# Patient Record
Sex: MALE | Race: WHITE | NOT HISPANIC OR LATINO | Employment: OTHER | URBAN - METROPOLITAN AREA
[De-identification: names, ages, dates, MRNs, and addresses within clinical notes are randomized per-mention and may not be internally consistent; named-entity substitution may affect disease eponyms.]

---

## 2021-06-13 ENCOUNTER — HOSPITAL ENCOUNTER (OUTPATIENT)
Facility: HOSPITAL | Age: 51
Setting detail: OBSERVATION
Discharge: HOME | End: 2021-06-16
Attending: EMERGENCY MEDICINE | Admitting: HOSPITALIST

## 2021-06-13 DIAGNOSIS — N39.0 COMPLICATED UTI (URINARY TRACT INFECTION): Primary | ICD-10-CM

## 2021-06-13 DIAGNOSIS — R93.2 ABNORMAL FINDING ON IMAGING OF LIVER: ICD-10-CM

## 2021-06-13 DIAGNOSIS — N17.9 AKI (ACUTE KIDNEY INJURY) (HCC): ICD-10-CM

## 2021-06-13 LAB
ANION GAP SERPL CALCULATED.3IONS-SCNC: 10.3 MMOL/L (ref 5–15)
APTT PPP: 30.7 SECONDS (ref 22.7–35.4)
BACTERIA UR QL AUTO: ABNORMAL /HPF
BASOPHILS # BLD AUTO: 0.08 10*3/MM3 (ref 0–0.2)
BASOPHILS NFR BLD AUTO: 0.5 % (ref 0–1.5)
BILIRUB UR QL STRIP: NEGATIVE
BUN SERPL-MCNC: 15 MG/DL (ref 6–20)
BUN/CREAT SERPL: 10.3 (ref 7–25)
CALCIUM SPEC-SCNC: 9.8 MG/DL (ref 8.6–10.5)
CHLORIDE SERPL-SCNC: 96 MMOL/L (ref 98–107)
CLARITY UR: ABNORMAL
CO2 SERPL-SCNC: 28.7 MMOL/L (ref 22–29)
COLOR UR: ABNORMAL
CREAT SERPL-MCNC: 1.45 MG/DL (ref 0.76–1.27)
D-LACTATE SERPL-SCNC: 1.8 MMOL/L (ref 0.5–2)
DEPRECATED RDW RBC AUTO: 43.5 FL (ref 37–54)
EOSINOPHIL # BLD AUTO: 0.05 10*3/MM3 (ref 0–0.4)
EOSINOPHIL NFR BLD AUTO: 0.3 % (ref 0.3–6.2)
ERYTHROCYTE [DISTWIDTH] IN BLOOD BY AUTOMATED COUNT: 11.9 % (ref 12.3–15.4)
GFR SERPL CREATININE-BSD FRML MDRD: 51 ML/MIN/1.73
GLUCOSE SERPL-MCNC: 282 MG/DL (ref 65–99)
GLUCOSE UR STRIP-MCNC: ABNORMAL MG/DL
HCT VFR BLD AUTO: 47.9 % (ref 37.5–51)
HGB BLD-MCNC: 16.4 G/DL (ref 13–17.7)
HGB UR QL STRIP.AUTO: ABNORMAL
HYALINE CASTS UR QL AUTO: ABNORMAL /LPF
IMM GRANULOCYTES # BLD AUTO: 0.06 10*3/MM3 (ref 0–0.05)
IMM GRANULOCYTES NFR BLD AUTO: 0.4 % (ref 0–0.5)
INR PPP: 1.07 (ref 0.9–1.1)
KETONES UR QL STRIP: ABNORMAL
LEUKOCYTE ESTERASE UR QL STRIP.AUTO: ABNORMAL
LYMPHOCYTES # BLD AUTO: 2.24 10*3/MM3 (ref 0.7–3.1)
LYMPHOCYTES NFR BLD AUTO: 14.9 % (ref 19.6–45.3)
MCH RBC QN AUTO: 33.9 PG (ref 26.6–33)
MCHC RBC AUTO-ENTMCNC: 34.2 G/DL (ref 31.5–35.7)
MCV RBC AUTO: 99 FL (ref 79–97)
MONOCYTES # BLD AUTO: 1.05 10*3/MM3 (ref 0.1–0.9)
MONOCYTES NFR BLD AUTO: 7 % (ref 5–12)
NEUTROPHILS NFR BLD AUTO: 11.55 10*3/MM3 (ref 1.7–7)
NEUTROPHILS NFR BLD AUTO: 76.9 % (ref 42.7–76)
NITRITE UR QL STRIP: POSITIVE
NRBC BLD AUTO-RTO: 0 /100 WBC (ref 0–0.2)
PH UR STRIP.AUTO: 5.5 [PH] (ref 5–8)
PLATELET # BLD AUTO: 301 10*3/MM3 (ref 140–450)
PMV BLD AUTO: 8.9 FL (ref 6–12)
POTASSIUM SERPL-SCNC: 4.4 MMOL/L (ref 3.5–5.2)
PROT UR QL STRIP: ABNORMAL
PROTHROMBIN TIME: 13.7 SECONDS (ref 11.7–14.2)
RBC # BLD AUTO: 4.84 10*6/MM3 (ref 4.14–5.8)
RBC # UR: ABNORMAL /HPF
REF LAB TEST METHOD: ABNORMAL
SODIUM SERPL-SCNC: 135 MMOL/L (ref 136–145)
SP GR UR STRIP: 1.03 (ref 1–1.03)
SQUAMOUS #/AREA URNS HPF: ABNORMAL /HPF
UROBILINOGEN UR QL STRIP: ABNORMAL
WBC # BLD AUTO: 15.03 10*3/MM3 (ref 3.4–10.8)
WBC UR QL AUTO: ABNORMAL /HPF

## 2021-06-13 PROCEDURE — 83605 ASSAY OF LACTIC ACID: CPT | Performed by: EMERGENCY MEDICINE

## 2021-06-13 PROCEDURE — U0004 COV-19 TEST NON-CDC HGH THRU: HCPCS | Performed by: EMERGENCY MEDICINE

## 2021-06-13 PROCEDURE — 87186 SC STD MICRODIL/AGAR DIL: CPT | Performed by: EMERGENCY MEDICINE

## 2021-06-13 PROCEDURE — 87086 URINE CULTURE/COLONY COUNT: CPT | Performed by: EMERGENCY MEDICINE

## 2021-06-13 PROCEDURE — 81001 URINALYSIS AUTO W/SCOPE: CPT | Performed by: EMERGENCY MEDICINE

## 2021-06-13 PROCEDURE — 85610 PROTHROMBIN TIME: CPT | Performed by: EMERGENCY MEDICINE

## 2021-06-13 PROCEDURE — 96361 HYDRATE IV INFUSION ADD-ON: CPT

## 2021-06-13 PROCEDURE — 87040 BLOOD CULTURE FOR BACTERIA: CPT | Performed by: EMERGENCY MEDICINE

## 2021-06-13 PROCEDURE — 85025 COMPLETE CBC W/AUTO DIFF WBC: CPT | Performed by: EMERGENCY MEDICINE

## 2021-06-13 PROCEDURE — G0378 HOSPITAL OBSERVATION PER HR: HCPCS

## 2021-06-13 PROCEDURE — 96365 THER/PROPH/DIAG IV INF INIT: CPT

## 2021-06-13 PROCEDURE — 25010000002 CEFTRIAXONE PER 250 MG: Performed by: EMERGENCY MEDICINE

## 2021-06-13 PROCEDURE — 87088 URINE BACTERIA CULTURE: CPT | Performed by: EMERGENCY MEDICINE

## 2021-06-13 PROCEDURE — C9803 HOPD COVID-19 SPEC COLLECT: HCPCS

## 2021-06-13 PROCEDURE — 99284 EMERGENCY DEPT VISIT MOD MDM: CPT

## 2021-06-13 PROCEDURE — 80048 BASIC METABOLIC PNL TOTAL CA: CPT | Performed by: EMERGENCY MEDICINE

## 2021-06-13 PROCEDURE — 85730 THROMBOPLASTIN TIME PARTIAL: CPT | Performed by: EMERGENCY MEDICINE

## 2021-06-13 RX ORDER — ONDANSETRON 2 MG/ML
4 INJECTION INTRAMUSCULAR; INTRAVENOUS EVERY 6 HOURS PRN
Status: DISCONTINUED | OUTPATIENT
Start: 2021-06-13 | End: 2021-06-16 | Stop reason: HOSPADM

## 2021-06-13 RX ORDER — GLIPIZIDE 5 MG/1
5 TABLET ORAL
COMMUNITY

## 2021-06-13 RX ORDER — SODIUM CHLORIDE 9 MG/ML
75 INJECTION, SOLUTION INTRAVENOUS CONTINUOUS
Status: DISCONTINUED | OUTPATIENT
Start: 2021-06-13 | End: 2021-06-15

## 2021-06-13 RX ORDER — CEFTRIAXONE SODIUM 1 G/50ML
1 INJECTION, SOLUTION INTRAVENOUS ONCE
Status: COMPLETED | OUTPATIENT
Start: 2021-06-13 | End: 2021-06-13

## 2021-06-13 RX ORDER — INSULIN LISPRO 100 [IU]/ML
0-9 INJECTION, SOLUTION INTRAVENOUS; SUBCUTANEOUS
Status: DISCONTINUED | OUTPATIENT
Start: 2021-06-14 | End: 2021-06-16 | Stop reason: HOSPADM

## 2021-06-13 RX ORDER — NICOTINE POLACRILEX 4 MG
15 LOZENGE BUCCAL
Status: DISCONTINUED | OUTPATIENT
Start: 2021-06-13 | End: 2021-06-16 | Stop reason: HOSPADM

## 2021-06-13 RX ORDER — LISINOPRIL AND HYDROCHLOROTHIAZIDE 20; 12.5 MG/1; MG/1
1 TABLET ORAL EVERY MORNING
COMMUNITY

## 2021-06-13 RX ORDER — SODIUM CHLORIDE 0.9 % (FLUSH) 0.9 %
10 SYRINGE (ML) INJECTION AS NEEDED
Status: DISCONTINUED | OUTPATIENT
Start: 2021-06-13 | End: 2021-06-16 | Stop reason: HOSPADM

## 2021-06-13 RX ORDER — CEFTRIAXONE SODIUM 1 G/50ML
1 INJECTION, SOLUTION INTRAVENOUS EVERY 24 HOURS
Status: DISCONTINUED | OUTPATIENT
Start: 2021-06-14 | End: 2021-06-15

## 2021-06-13 RX ORDER — ACETAMINOPHEN 325 MG/1
650 TABLET ORAL EVERY 4 HOURS PRN
Status: DISCONTINUED | OUTPATIENT
Start: 2021-06-13 | End: 2021-06-16

## 2021-06-13 RX ORDER — ONDANSETRON 4 MG/1
4 TABLET, FILM COATED ORAL EVERY 6 HOURS PRN
Status: DISCONTINUED | OUTPATIENT
Start: 2021-06-13 | End: 2021-06-16 | Stop reason: HOSPADM

## 2021-06-13 RX ORDER — NITROGLYCERIN 0.4 MG/1
0.4 TABLET SUBLINGUAL
Status: DISCONTINUED | OUTPATIENT
Start: 2021-06-13 | End: 2021-06-14

## 2021-06-13 RX ORDER — UREA 10 %
3 LOTION (ML) TOPICAL NIGHTLY PRN
Status: DISCONTINUED | OUTPATIENT
Start: 2021-06-13 | End: 2021-06-16 | Stop reason: HOSPADM

## 2021-06-13 RX ORDER — DEXTROSE MONOHYDRATE 25 G/50ML
25 INJECTION, SOLUTION INTRAVENOUS
Status: DISCONTINUED | OUTPATIENT
Start: 2021-06-13 | End: 2021-06-16 | Stop reason: HOSPADM

## 2021-06-13 RX ORDER — LISINOPRIL 20 MG/1
20 TABLET ORAL
Status: DISCONTINUED | OUTPATIENT
Start: 2021-06-14 | End: 2021-06-16 | Stop reason: HOSPADM

## 2021-06-13 RX ADMIN — SODIUM CHLORIDE 75 ML/HR: 9 INJECTION, SOLUTION INTRAVENOUS at 23:03

## 2021-06-13 RX ADMIN — ACETAMINOPHEN 650 MG: 325 TABLET, FILM COATED ORAL at 23:09

## 2021-06-13 RX ADMIN — SODIUM CHLORIDE, POTASSIUM CHLORIDE, SODIUM LACTATE AND CALCIUM CHLORIDE 1000 ML: 600; 310; 30; 20 INJECTION, SOLUTION INTRAVENOUS at 17:11

## 2021-06-13 RX ADMIN — CEFTRIAXONE SODIUM 1 G: 1 INJECTION, SOLUTION INTRAVENOUS at 18:46

## 2021-06-13 NOTE — ED TRIAGE NOTES
Pt states he has been unable to urinate since this morning approx 0800. Pt arrives aaox4, abc's intact, ambulatory with steady gait, NAD noted at this time. Pt placed in mask at triage. This RN also wearing a mask.

## 2021-06-13 NOTE — PROGRESS NOTES
Clinical Pharmacy Services: Medication History    Orville Carrillo is a 51 y.o. male presenting to [facility] for Complicated UTI (urinary tract infection) [N39.0]    He  has a past medical history of Diabetes mellitus (CMS/[facility]), Hypertension, and Short-term memory loss.    Allergies as of 06/13/2021 - Reviewed 06/13/2021   Allergen Reaction Noted   • Penicillins Unknown - Low Severity 11/09/2017       Medication information was obtained from: Patient  Pharmacy and Phone Number: [facility] - [place], [place] - [address] [facility] [address] - [phone] [facility] [phone] FX        Prior to Admission Medications     Prescriptions Last Dose Informant Patient Reported? Taking?    glipizide (GLUCOTROL) 5 MG tablet 6/13/2021 Self Yes Yes    Take 5 mg by mouth Daily With Breakfast.    lisinopril-hydrochlorothiazide (PRINZIDE,ZESTORETIC) 20-12.5 MG per tablet 6/13/2021 Self Yes Yes    Take 1 tablet by mouth Every Morning.    metFORMIN (GLUCOPHAGE) 1000 MG tablet 6/13/2021 Self Yes Yes    Take 1,000 mg by mouth 2 (Two) Times a Day With Meals.    SITagliptin (JANUVIA) 100 MG tablet 6/13/2021 Self Yes Yes    Take 100 mg by mouth Daily With Breakfast.            Medication notes:     This medication list is complete to the best of my knowledge as of 6/13/2021    Please call if questions.    Mainor Felix CP  6/13/2021 19:48 EDT

## 2021-06-13 NOTE — ED NOTES
"Nursing report ED to floor  Orville Carrillo  51 y.o.  male    HPI (triage note):   Chief Complaint   Patient presents with   • Urinary Retention       Admitting doctor:   Samantha Smiley MD    Admitting diagnosis:   The primary encounter diagnosis was Complicated UTI (urinary tract infection). A diagnosis of ROWENA (acute kidney injury) (CMS/[facility]) was also pertinent to this visit.    Code status:   Current Code Status     Date Active Code Status Order ID Comments User Context       Not on file    Advance Care Planning Activity          Allergies:   Penicillins    Weight:   There were no vitals filed for this visit.    Most recent vitals:   Vitals:    06/13/21 1558 06/13/21 1617   BP:  133/84   Pulse: (!) 121 96   Resp: 18    Temp: 96.8 °F (36 °C)    TempSrc: Tympanic    SpO2: 92%    Height: 180.3 cm (71\")        Active LDAs/IV Access:   Lines, Drains & Airways    Active LDAs     Name:   Placement date:   Placement time:   Site:   Days:    Peripheral IV 06/13/21 1623 Right Antecubital   06/13/21    1623    Antecubital   less than 1                Labs (abnormal labs have a star):   Labs Reviewed   BASIC METABOLIC PANEL - Abnormal; Notable for the following components:       Result Value    Glucose 282 (*)     Creatinine 1.45 (*)     Sodium 135 (*)     Chloride 96 (*)     eGFR Non  Amer 51 (*)     All other components within normal limits    Narrative:     GFR Normal >60  Chronic Kidney Disease <60  Kidney Failure <15     CBC WITH AUTO DIFFERENTIAL - Abnormal; Notable for the following components:    WBC 15.03 (*)     MCV 99.0 (*)     MCH 33.9 (*)     RDW 11.9 (*)     Neutrophil % 76.9 (*)     Lymphocyte % 14.9 (*)     Neutrophils, Absolute 11.55 (*)     Monocytes, Absolute 1.05 (*)     Immature Grans, Absolute 0.06 (*)     All other components within normal limits   URINALYSIS W/ CULTURE IF INDICATED - Abnormal; Notable for the following components:    Color, UA Dark Yellow (*)     Appearance, UA Turbid (*)  "    Glucose, UA >=1000 mg/dL (3+) (*)     Ketones, UA Trace (*)     Blood, UA Large (3+) (*)     Protein, UA >=300 mg/dL (3+) (*)     Leuk Esterase, UA Moderate (2+) (*)     Nitrite, UA Positive (*)     All other components within normal limits   URINALYSIS, MICROSCOPIC ONLY - Abnormal; Notable for the following components:    RBC, UA Too Numerous to Count (*)     WBC, UA Too Numerous to Count (*)     Bacteria, UA 4+ (*)     All other components within normal limits   PROTIME-INR - Normal   APTT - Normal   LACTIC ACID, PLASMA - Normal   URINE CULTURE   BLOOD CULTURE   BLOOD CULTURE   COVID PRE-OP / PRE-PROCEDURE SCREENING ORDER (NO ISOLATION)    Narrative:     The following orders were created for panel order COVID PRE-OP / PRE-PROCEDURE SCREENING ORDER (NO ISOLATION) - Swab, Nasopharynx.  Procedure                               Abnormality         Status                     ---------                               -----------         ------                     COVID-19,APTIMA PANTHER,...[[id number]]                                                   Please view results for these tests on the individual orders.   COVID-19,APTIMA PANTHER,JEF IN-HOUSE,NP/OP SWAB IN UTM/VTM/SALINE TRANSPORT MEDIA,24 HR TAT   CBC AND DIFFERENTIAL    Narrative:     The following orders were created for panel order CBC & Differential.  Procedure                               Abnormality         Status                     ---------                               -----------         ------                     CBC Auto Differential[[id number]]        Abnormal            Final result                 Please view results for these tests on the individual orders.       EKG:   No orders to display       Meds given in ED:   Medications   sodium chloride 0.9 % flush 10 mL (has no administration in time range)   lactated ringers bolus 1,000 mL (0 mL Intravenous Stopped 6/13/21 1842)   cefTRIAXone (ROCEPHIN) IVPB 1 g (1 g Intravenous New Bag 6/13/21 1846)        Imaging results:  No radiology results for the last day    Ambulatory status:   - ad [place]    Social issues:   Social History     Socioeconomic History   • Marital status:      Spouse name: Not on file   • Number of children: Not on file   • Years of education: Not on file   • Highest education level: Not on file   Tobacco Use   • Smoking status: Never Smoker   • Smokeless tobacco: Current User     Types: Chew   Substance and Sexual Activity   • Alcohol use: Yes     Comment: occ   • Drug use: No   • Sexual activity: Defer    Nursing report ED to floor     Mireille Moon RN  06/13/21 1955

## 2021-06-13 NOTE — ED NOTES
Report received from DARÍO Marsh. Pt has clean bed, updated on status.     Mireille Moon RN  06/13/21 1929

## 2021-06-13 NOTE — ED PROVIDER NOTES
EMERGENCY DEPARTMENT ENCOUNTER    Room Number:  P383/1  Date of encounter:  6/13/2021  PCP: Chace Brown MD  Historian: Patient      HPI:  Chief Complaint: Dysuria  A complete HPI/ROS/PMH/PSH/SH/FH are unobtainable due to: None    Context: Orville Carrillo is a 51 y.o. male who presents to the ED c/o dysuria and blood clots in his urine.  Onset shortly after a.m. today.  He reports having constant discomfort in his therapeutic region with significant burning with urination.  He feels a sense of urgency.  He has been passing small little blood clots.  He takes no blood thinners.  No fever or vomiting.  No history of similar.  He is diabetic and has a history of hypertension.    Patient reports having a urethral surgery of unknown type as a child.      PAST MEDICAL HISTORY  Active Ambulatory Problems     Diagnosis Date Noted   • No Active Ambulatory Problems     Resolved Ambulatory Problems     Diagnosis Date Noted   • No Resolved Ambulatory Problems     Past Medical History:   Diagnosis Date   • Diabetes mellitus (CMS/HCC)    • Hypertension    • Short-term memory loss          PAST SURGICAL HISTORY  Past Surgical History:   Procedure Laterality Date   • HAND SURGERY           FAMILY HISTORY  Family History   Problem Relation Age of Onset   • Cancer Mother    • Heart disease Father          SOCIAL HISTORY  Social History     Socioeconomic History   • Marital status:      Spouse name: Not on file   • Number of children: Not on file   • Years of education: Not on file   • Highest education level: Not on file   Tobacco Use   • Smoking status: Never Smoker   • Smokeless tobacco: Current User     Types: Chew   Vaping Use   • Vaping Use: Never used   Substance and Sexual Activity   • Alcohol use: Yes     Comment: occ   • Drug use: No   • Sexual activity: Defer         ALLERGIES  Penicillins        REVIEW OF SYSTEMS  Review of Systems     All systems reviewed and negative except for those discussed in HPI.        PHYSICAL EXAM    I have reviewed the triage vital signs and nursing notes.    ED Triage Vitals [06/13/21 1558]   Temp Heart Rate Resp BP SpO2   96.8 °F (36 °C) (!) 121 18 -- 92 %      Temp src Heart Rate Source Patient Position BP Location FiO2 (%)   Tympanic -- -- -- --       Physical Exam  GENERAL: not distressed  HENT: nares patent  EYES: no scleral icterus  CV: regular rhythm, regular rate  RESPIRATORY: normal effort, clear to auscultation bilaterally  ABDOMEN: soft, nontender, no rebound or guarding  : No blood at the urethral meatus, normal external genitalia  MUSCULOSKELETAL: no deformity  NEURO: alert, moves all extremities, follows commands  SKIN: warm, dry        LAB RESULTS  Recent Results (from the past 24 hour(s))   Basic Metabolic Panel    Collection Time: 06/13/21  4:23 PM    Specimen: Blood   Result Value Ref Range    Glucose 282 (H) 65 - 99 mg/dL    BUN 15 6 - 20 mg/dL    Creatinine 1.45 (H) 0.76 - 1.27 mg/dL    Sodium 135 (L) 136 - 145 mmol/L    Potassium 4.4 3.5 - 5.2 mmol/L    Chloride 96 (L) 98 - 107 mmol/L    CO2 28.7 22.0 - 29.0 mmol/L    Calcium 9.8 8.6 - 10.5 mg/dL    eGFR Non African Amer 51 (L) >60 mL/min/1.73    BUN/Creatinine Ratio 10.3 7.0 - 25.0    Anion Gap 10.3 5.0 - 15.0 mmol/L   CBC Auto Differential    Collection Time: 06/13/21  4:23 PM    Specimen: Blood   Result Value Ref Range    WBC 15.03 (H) 3.40 - 10.80 10*3/mm3    RBC 4.84 4.14 - 5.80 10*6/mm3    Hemoglobin 16.4 13.0 - 17.7 g/dL    Hematocrit 47.9 37.5 - 51.0 %    MCV 99.0 (H) 79.0 - 97.0 fL    MCH 33.9 (H) 26.6 - 33.0 pg    MCHC 34.2 31.5 - 35.7 g/dL    RDW 11.9 (L) 12.3 - 15.4 %    RDW-SD 43.5 37.0 - 54.0 fl    MPV 8.9 6.0 - 12.0 fL    Platelets 301 140 - 450 10*3/mm3    Neutrophil % 76.9 (H) 42.7 - 76.0 %    Lymphocyte % 14.9 (L) 19.6 - 45.3 %    Monocyte % 7.0 5.0 - 12.0 %    Eosinophil % 0.3 0.3 - 6.2 %    Basophil % 0.5 0.0 - 1.5 %    Immature Grans % 0.4 0.0 - 0.5 %    Neutrophils, Absolute 11.55 (H) 1.70 -  7.00 10*3/mm3    Lymphocytes, Absolute 2.24 0.70 - 3.10 10*3/mm3    Monocytes, Absolute 1.05 (H) 0.10 - 0.90 10*3/mm3    Eosinophils, Absolute 0.05 0.00 - 0.40 10*3/mm3    Basophils, Absolute 0.08 0.00 - 0.20 10*3/mm3    Immature Grans, Absolute 0.06 (H) 0.00 - 0.05 10*3/mm3    nRBC 0.0 0.0 - 0.2 /100 WBC   Protime-INR    Collection Time: 06/13/21  4:23 PM    Specimen: Blood   Result Value Ref Range    Protime 13.7 11.7 - 14.2 Seconds    INR 1.07 0.90 - 1.10   aPTT    Collection Time: 06/13/21  4:23 PM    Specimen: Blood   Result Value Ref Range    PTT 30.7 22.7 - 35.4 seconds   Urinalysis With Culture If Indicated - Urine, Clean Catch    Collection Time: 06/13/21  5:02 PM    Specimen: Urine, Clean Catch   Result Value Ref Range    Color, UA Dark Yellow (A) Yellow, Straw    Appearance, UA Turbid (A) Clear    pH, UA 5.5 5.0 - 8.0    Specific Gravity, UA 1.027 1.005 - 1.030    Glucose, UA >=1000 mg/dL (3+) (A) Negative    Ketones, UA Trace (A) Negative    Bilirubin, UA Negative Negative    Blood, UA Large (3+) (A) Negative    Protein, UA >=300 mg/dL (3+) (A) Negative    Leuk Esterase, UA Moderate (2+) (A) Negative    Nitrite, UA Positive (A) Negative    Urobilinogen, UA 1.0 E.U./dL 0.2 - 1.0 E.U./dL   Urinalysis, Microscopic Only - Urine, Clean Catch    Collection Time: 06/13/21  5:02 PM    Specimen: Urine, Clean Catch   Result Value Ref Range    RBC, UA Too Numerous to Count (A) None Seen, 0-2 /HPF    WBC, UA Too Numerous to Count (A) None Seen, 0-2 /HPF    Bacteria, UA 4+ (A) None Seen /HPF    Squamous Epithelial Cells, UA None Seen None Seen, 0-2 /HPF    Hyaline Casts, UA None Seen None Seen /LPF    Methodology Manual Light Microscopy    Lactic Acid, Plasma    Collection Time: 06/13/21  6:43 PM    Specimen: Blood   Result Value Ref Range    Lactate 1.8 0.5 - 2.0 mmol/L       Ordered the above labs and independently reviewed the results.        RADIOLOGY  No Radiology Exams Resulted Within Past 24 Hours    I ordered  the above noted radiological studies. Reviewed by me and discussed with radiologist.  See dictation for official radiology interpretation.      PROCEDURES    Procedures      MEDICATIONS GIVEN IN ER    Medications   sodium chloride 0.9 % flush 10 mL (has no administration in time range)   cefTRIAXone (ROCEPHIN) IVPB 1 g (has no administration in time range)   dextrose (GLUTOSE) oral gel 15 g (has no administration in time range)   dextrose (D50W) 25 g/ 50mL Intravenous Solution 25 g (has no administration in time range)   glucagon (human recombinant) (GLUCAGEN DIAGNOSTIC) injection 1 mg (has no administration in time range)   insulin lispro (ADMELOG) injection 0-9 Units (has no administration in time range)   sodium chloride 0.9 % infusion (has no administration in time range)   lisinopril (PRINIVIL,ZESTRIL) tablet 20 mg (has no administration in time range)   nitroglycerin (NITROSTAT) SL tablet 0.4 mg (has no administration in time range)   acetaminophen (TYLENOL) tablet 650 mg (has no administration in time range)   ondansetron (ZOFRAN) tablet 4 mg (has no administration in time range)     Or   ondansetron (ZOFRAN) injection 4 mg (has no administration in time range)   melatonin tablet 3 mg (has no administration in time range)   lactated ringers bolus 1,000 mL (0 mL Intravenous Stopped 6/13/21 1842)   cefTRIAXone (ROCEPHIN) IVPB 1 g (1 g Intravenous New Bag 6/13/21 1846)         PROGRESS, DATA ANALYSIS, CONSULTS, AND MEDICAL DECISION MAKING    All labs have been independently reviewed by me.  All radiology studies have been reviewed by me and discussed with radiologist dictating the report.   EKG's independently viewed and interpreted by me.  Discussion below represents my analysis of pertinent findings related to patient's condition, differential diagnosis, treatment plan and final disposition.    Differential diagnosis includes urinary retention, UTI, pyelonephritis, ureteral stone.    ED Course as of Jun 13  2245   [place] Jun 13, 2021   1641 WBC(!): 15.03 [TD]   1703 Creatinine(!): 1.45 [TD]   1703 Sodium(!): 135 [TD]   1806 RBC, UA(!): Too Numerous to Count [TD]   1806 WBC, UA(!): Too Numerous to Count [TD]   1806 Bacteria, UA(!): 4+ [TD]   1806 Leukocytes, UA(!): Moderate (2+) [TD]   1806 Nitrite, UA(!): Positive [TD]   1806 Heart Rate(!): 121 [TD]   1858 I discussed the case with Dr. Sweeney, hospitalist.  I reviewed the patient's labs and history.  She will admit to [facility].    [TD]      ED Course User Index  [TD] Wyatt Strickland II, MD       Patient has no abdominal pain aside from some suprapubic discomfort.  I do not believe that he has a kidney stone.  His urine is clearly infected.  Bladder scan was performed which showed no significant volume at 67 cc.    PPE: Both the patient and I wore a surgical mask throughout the entire patient encounter. I wore protective goggles.     AS OF 22:45 EDT VITALS:    BP - 140/94  HR - 96  TEMP - 96.8 °F (36 °C) (Tympanic)  O2 SATS - 92%        DIAGNOSIS  Final diagnoses:   Complicated UTI (urinary tract infection)   ROWENA (acute kidney injury) (CMS/[facility])         DISPOSITION  Admit           Wyatt Strickland II, MD  06/13/21 4545

## 2021-06-13 NOTE — ED NOTES
Blood culture : [id number]  Right hand      : [id number] right ac     Samla Watts, RN  06/13/21 7633

## 2021-06-14 ENCOUNTER — APPOINTMENT (OUTPATIENT)
Dept: CT IMAGING | Facility: HOSPITAL | Age: 51
End: 2021-06-14

## 2021-06-14 LAB
ALBUMIN SERPL-MCNC: 4.1 G/DL (ref 3.5–5.2)
ALP SERPL-CCNC: 63 U/L (ref 39–117)
ALT SERPL W P-5'-P-CCNC: 56 U/L (ref 1–41)
ANION GAP SERPL CALCULATED.3IONS-SCNC: 11.5 MMOL/L (ref 5–15)
AST SERPL-CCNC: 35 U/L (ref 1–40)
BILIRUB CONJ SERPL-MCNC: 0.2 MG/DL (ref 0–0.3)
BILIRUB INDIRECT SERPL-MCNC: 1 MG/DL
BILIRUB SERPL-MCNC: 1.2 MG/DL (ref 0–1.2)
BUN SERPL-MCNC: 13 MG/DL (ref 6–20)
BUN/CREAT SERPL: 12.7 (ref 7–25)
CALCIUM SPEC-SCNC: 9 MG/DL (ref 8.6–10.5)
CHLORIDE SERPL-SCNC: 98 MMOL/L (ref 98–107)
CO2 SERPL-SCNC: 22.5 MMOL/L (ref 22–29)
CREAT SERPL-MCNC: 1.02 MG/DL (ref 0.76–1.27)
CRP SERPL-MCNC: 1.69 MG/DL (ref 0–0.5)
DEPRECATED RDW RBC AUTO: 40.6 FL (ref 37–54)
ERYTHROCYTE [DISTWIDTH] IN BLOOD BY AUTOMATED COUNT: 11.6 % (ref 12.3–15.4)
ERYTHROCYTE [SEDIMENTATION RATE] IN BLOOD: 14 MM/HR (ref 0–20)
GFR SERPL CREATININE-BSD FRML MDRD: 77 ML/MIN/1.73
GLUCOSE BLDC GLUCOMTR-MCNC: 251 MG/DL (ref 70–130)
GLUCOSE BLDC GLUCOMTR-MCNC: 251 MG/DL (ref 70–130)
GLUCOSE BLDC GLUCOMTR-MCNC: 257 MG/DL (ref 70–130)
GLUCOSE BLDC GLUCOMTR-MCNC: 268 MG/DL (ref 70–130)
GLUCOSE SERPL-MCNC: 246 MG/DL (ref 65–99)
HBA1C MFR BLD: 8.7 % (ref 4.8–5.6)
HCT VFR BLD AUTO: 43.1 % (ref 37.5–51)
HGB BLD-MCNC: 15.2 G/DL (ref 13–17.7)
MCH RBC QN AUTO: 33.7 PG (ref 26.6–33)
MCHC RBC AUTO-ENTMCNC: 35.3 G/DL (ref 31.5–35.7)
MCV RBC AUTO: 95.6 FL (ref 79–97)
PLATELET # BLD AUTO: 257 10*3/MM3 (ref 140–450)
PMV BLD AUTO: 9.8 FL (ref 6–12)
POTASSIUM SERPL-SCNC: 4.4 MMOL/L (ref 3.5–5.2)
PROT SERPL-MCNC: 7.9 G/DL (ref 6–8.5)
RBC # BLD AUTO: 4.51 10*6/MM3 (ref 4.14–5.8)
SARS-COV-2 ORF1AB RESP QL NAA+PROBE: NOT DETECTED
SODIUM SERPL-SCNC: 132 MMOL/L (ref 136–145)
TSH SERPL DL<=0.05 MIU/L-ACNC: 1.43 UIU/ML (ref 0.27–4.2)
WBC # BLD AUTO: 9.87 10*3/MM3 (ref 3.4–10.8)

## 2021-06-14 PROCEDURE — 63710000001 INSULIN LISPRO (HUMAN) PER 5 UNITS: Performed by: INTERNAL MEDICINE

## 2021-06-14 PROCEDURE — 86140 C-REACTIVE PROTEIN: CPT | Performed by: HOSPITALIST

## 2021-06-14 PROCEDURE — 82962 GLUCOSE BLOOD TEST: CPT

## 2021-06-14 PROCEDURE — 83036 HEMOGLOBIN GLYCOSYLATED A1C: CPT | Performed by: INTERNAL MEDICINE

## 2021-06-14 PROCEDURE — 85652 RBC SED RATE AUTOMATED: CPT | Performed by: HOSPITALIST

## 2021-06-14 PROCEDURE — 80076 HEPATIC FUNCTION PANEL: CPT | Performed by: NURSE PRACTITIONER

## 2021-06-14 PROCEDURE — G0378 HOSPITAL OBSERVATION PER HR: HCPCS

## 2021-06-14 PROCEDURE — 80048 BASIC METABOLIC PNL TOTAL CA: CPT | Performed by: INTERNAL MEDICINE

## 2021-06-14 PROCEDURE — 96361 HYDRATE IV INFUSION ADD-ON: CPT

## 2021-06-14 PROCEDURE — 25010000002 CEFTRIAXONE PER 250 MG: Performed by: INTERNAL MEDICINE

## 2021-06-14 PROCEDURE — 74176 CT ABD & PELVIS W/O CONTRAST: CPT

## 2021-06-14 PROCEDURE — 85027 COMPLETE CBC AUTOMATED: CPT | Performed by: INTERNAL MEDICINE

## 2021-06-14 PROCEDURE — 84443 ASSAY THYROID STIM HORMONE: CPT | Performed by: HOSPITALIST

## 2021-06-14 PROCEDURE — 36415 COLL VENOUS BLD VENIPUNCTURE: CPT | Performed by: INTERNAL MEDICINE

## 2021-06-14 RX ADMIN — CEFTRIAXONE SODIUM 1 G: 1 INJECTION, SOLUTION INTRAVENOUS at 17:44

## 2021-06-14 RX ADMIN — INSULIN LISPRO 6 UNITS: 100 INJECTION, SOLUTION INTRAVENOUS; SUBCUTANEOUS at 17:44

## 2021-06-14 RX ADMIN — LISINOPRIL 20 MG: 20 TABLET ORAL at 08:44

## 2021-06-14 RX ADMIN — Medication 3 MG: at 21:24

## 2021-06-14 RX ADMIN — ACETAMINOPHEN 650 MG: 325 TABLET, FILM COATED ORAL at 21:24

## 2021-06-14 RX ADMIN — INSULIN LISPRO 6 UNITS: 100 INJECTION, SOLUTION INTRAVENOUS; SUBCUTANEOUS at 12:09

## 2021-06-14 RX ADMIN — SODIUM CHLORIDE 75 ML/HR: 9 INJECTION, SOLUTION INTRAVENOUS at 12:04

## 2021-06-14 RX ADMIN — INSULIN LISPRO 6 UNITS: 100 INJECTION, SOLUTION INTRAVENOUS; SUBCUTANEOUS at 08:45

## 2021-06-14 NOTE — CONSULTS
Urology Consult  Patient Identification:  Name: Orville Carrillo  Age: 51 y.o.  Sex: male  :  1970  MRN: [id number]                       Chief Complaint:  Gross hematuria    History of Present Illness: Pt admitted yesterday w acute onset gross painful hematuria,dysuria, w ucx + for ecoli. Symptoms better.       Problem List:  Active Hospital Problems    Diagnosis  POA   • **Complicated UTI (urinary tract infection) [N39.0]  Yes   • Short-term memory loss [R41.3]  Yes   • Type 2 diabetes mellitus with ophthalmic complication, without long-term current use of insulin (CMS/HCC) [E11.39]  Yes   • Hypertension [I10]  Yes   • Abnormal finding on imaging of liver [R93.2]  Yes   • Hematuria [R31.9]  Yes   • TBI (traumatic brain injury) (CMS/[facility]) [S06.9X9A]  Unknown   • Leukocytosis [D72.829]  Yes   • ROWENA (acute kidney injury) (CMS/[facility]) [N17.9]  Yes   • Alcohol use [Z72.89]  Yes     Past Medical History:  Past Medical History:   Diagnosis Date   • Diabetes mellitus (CMS/HCC)    • Hypertension    • Short-term memory loss      Past Surgical History:  Past Surgical History:   Procedure Laterality Date   • HAND SURGERY        Home Meds:  Medications Prior to Admission   Medication Sig Dispense Refill Last Dose   • glipizide (GLUCOTROL) 5 MG tablet Take 5 mg by mouth Daily With Breakfast.   2021 at Unknown time   • lisinopril-hydrochlorothiazide (PRINZIDE,ZESTORETIC) 20-12.5 MG per tablet Take 1 tablet by mouth Every Morning.   2021 at Unknown time   • metFORMIN (GLUCOPHAGE) 1000 MG tablet Take 1,000 mg by mouth 2 (Two) Times a Day With Meals.   2021 at Unknown time   • SITagliptin (JANUVIA) 100 MG tablet Take 100 mg by mouth Daily With Breakfast.   2021 at Unknown time     Current Meds:   Current Facility-Administered Medications   Medication Dose Route Frequency Provider Last Rate Last Admin   • acetaminophen (TYLENOL) tablet 650 mg  650 mg Oral Q4H PRN Stingjose, Samantha Padilla MD   650 mg at  21 2309   • cefTRIAXone (ROCEPHIN) IVPB 1 g  1 g Intravenous Q24H Samantha Smiley MD       • dextrose (D50W) 25 g/ 50mL Intravenous Solution 25 g  25 g Intravenous Q15 Min PRN Samantha Smiley MD       • dextrose (GLUTOSE) oral gel 15 g  15 g Oral Q15 Min PRN Samantha Smiley MD       • glucagon (human recombinant) (GLUCAGEN DIAGNOSTIC) injection 1 mg  1 mg Subcutaneous Q15 Min PRN Samantha Smiley MD       • insulin lispro (ADMELOG) injection 0-9 Units  0-9 Units Subcutaneous TID With Meals Samantha Smiley MD   6 Units at 21 1209   • lisinopril (PRINIVIL,ZESTRIL) tablet 20 mg  20 mg Oral Q24H Samantha Smiley MD   20 mg at 21 0844   • melatonin tablet 3 mg  3 mg Oral Nightly PRN Samantha Smiley MD       • ondansetron (ZOFRAN) tablet 4 mg  4 mg Oral Q6H PRN Samantha Smiley MD        Or   • ondansetron (ZOFRAN) injection 4 mg  4 mg Intravenous Q6H PRN Samantha Smiley MD       • sodium chloride 0.9 % flush 10 mL  10 mL Intravenous PRN Wyatt Strickland II, MD       • sodium chloride 0.9 % infusion  75 mL/hr Intravenous Continuous Samantha Smiley MD 75 mL/hr at 21 1635 75 mL/hr at 21 1635     Allergies:  Allergies   Allergen Reactions   • Penicillins Unknown - Low Severity     Childhood allergy     Immunizations:    There is no immunization history on file for this patient.  Social History:   Social History     Tobacco Use   • Smoking status: Never Smoker   • Smokeless tobacco: Current User     Types: Chew   Substance Use Topics   • Alcohol use: Yes     Comment: occ      Family History:  Family History   Problem Relation Age of Onset   • Cancer Mother    • Heart disease Father         Review of Systems  negative 12 point system review except:as above    Objective:  tMax 24 hrs: Temp (24hrs), Av.2 °F (36.8 °C), Min:97 °F (36.1 °C), Max:101.4 °F (38.6 °C)    Vitals Ranges:   Temp:  [97 °F (36.1 °C)-101.4 °F (38.6 °C)]  "97.8 °F (36.6 °C)  Heart Rate:  [63-83] 79  Resp:  [16-18] 18  BP: (114-140)/(73-94) 129/78  Intake and Output Last 3 Shifts:   I/O last 3 completed shifts:  In: 1120 [P.O.:120; IV Piggyback:1000]  Out: -     Exam:  /78 (BP Location: Left arm, Patient Position: Lying)   Pulse 79   Temp 97.8 °F (36.6 °C) (Oral)   Resp 18   Ht 180.3 cm (71\")   Wt 104 kg (229 lb 4.5 oz)   SpO2 91%   BMI 31.98 kg/m²      GENERAL:    Alert, cooperative, no distress, appears stated age   Head:    Normocephalic, without obvious abnormality, atraumatic   Ears:    Normal external inspection, Nl. hearing   Throat:   Lips, mucosa, and tongue normal   Back:     No CVA tenderness   Lungs:     Respirations unlabored;Normal palpation   CV;   Regular rate and rhythm, No edema   Abdomen:     Soft, nontender,  no masses   :    Penis,scotum, testicles wnl.    Musculoskeletal:   Extremities normal,Gait Normal   Neurologic/Psych:   Orientation Normal; Mood/Affect Normal       Data Review:   Admission on 06/13/2021   Component Date Value Ref Range Status   • Glucose 06/13/2021 282* 65 - 99 mg/dL Final   • BUN 06/13/2021 15  6 - 20 mg/dL Final   • Creatinine 06/13/2021 1.45* 0.76 - 1.27 mg/dL Final   • Sodium 06/13/2021 135* 136 - 145 mmol/L Final   • Potassium 06/13/2021 4.4  3.5 - 5.2 mmol/L Final   • Chloride 06/13/2021 96* 98 - 107 mmol/L Final   • CO2 06/13/2021 28.7  22.0 - 29.0 mmol/L Final   • Calcium 06/13/2021 9.8  8.6 - 10.5 mg/dL Final   • eGFR Non  Amer 06/13/2021 51* >60 mL/min/1.73 Final   • BUN/Creatinine Ratio 06/13/2021 10.3  7.0 - 25.0 Final   • Anion Gap 06/13/2021 10.3  5.0 - 15.0 mmol/L Final   • WBC 06/13/2021 15.03* 3.40 - 10.80 10*3/mm3 Final   • RBC 06/13/2021 4.84  4.14 - 5.80 10*6/mm3 Final   • Hemoglobin 06/13/2021 16.4  13.0 - 17.7 g/dL Final   • Hematocrit 06/13/2021 47.9  37.5 - 51.0 % Final   • MCV 06/13/2021 99.0* 79.0 - 97.0 fL Final   • MCH 06/13/2021 33.9* 26.6 - 33.0 pg Final   • MCHC 06/13/2021 " 34.2  31.5 - 35.7 g/dL Final   • RDW 06/13/2021 11.9* 12.3 - 15.4 % Final   • RDW-SD 06/13/2021 43.5  37.0 - 54.0 fl Final   • MPV 06/13/2021 8.9  6.0 - 12.0 fL Final   • Platelets 06/13/2021 301  140 - 450 10*3/mm3 Final   • Neutrophil % 06/13/2021 76.9* 42.7 - 76.0 % Final   • Lymphocyte % 06/13/2021 14.9* 19.6 - 45.3 % Final   • Monocyte % 06/13/2021 7.0  5.0 - 12.0 % Final   • Eosinophil % 06/13/2021 0.3  0.3 - 6.2 % Final   • Basophil % 06/13/2021 0.5  0.0 - 1.5 % Final   • Immature Grans % 06/13/2021 0.4  0.0 - 0.5 % Final   • Neutrophils, Absolute 06/13/2021 11.55* 1.70 - 7.00 10*3/mm3 Final   • Lymphocytes, Absolute 06/13/2021 2.24  0.70 - 3.10 10*3/mm3 Final   • Monocytes, Absolute 06/13/2021 1.05* 0.10 - 0.90 10*3/mm3 Final   • Eosinophils, Absolute 06/13/2021 0.05  0.00 - 0.40 10*3/mm3 Final   • Basophils, Absolute 06/13/2021 0.08  0.00 - 0.20 10*3/mm3 Final   • Immature Grans, Absolute 06/13/2021 0.06* 0.00 - 0.05 10*3/mm3 Final   • nRBC 06/13/2021 0.0  0.0 - 0.2 /100 WBC Final   • Color, UA 06/13/2021 Dark Yellow* Yellow, Straw Final   • Appearance, UA 06/13/2021 Turbid* Clear Final   • pH, UA 06/13/2021 5.5  5.0 - 8.0 Final   • Specific Gravity, UA 06/13/2021 1.027  1.005 - 1.030 Final   • Glucose, UA 06/13/2021 >=1000 mg/dL (3+)* Negative Final   • Ketones, UA 06/13/2021 Trace* Negative Final   • Bilirubin, UA 06/13/2021 Negative  Negative Final   • Blood, UA 06/13/2021 Large (3+)* Negative Final   • Protein, UA 06/13/2021 >=300 mg/dL (3+)* Negative Final   • Leuk Esterase, UA 06/13/2021 Moderate (2+)* Negative Final   • Nitrite, UA 06/13/2021 Positive* Negative Final   • Urobilinogen, UA 06/13/2021 1.0 E.U./dL  0.2 - 1.0 E.U./dL Final   • Protime 06/13/2021 13.7  11.7 - 14.2 Seconds Final   • INR 06/13/2021 1.07  0.90 - 1.10 Final   • PTT 06/13/2021 30.7  22.7 - 35.4 seconds Final   • RBC, UA 06/13/2021 Too Numerous to Count* None Seen, 0-2 /HPF Final   • WBC, UA 06/13/2021 Too Numerous to Count*  None Seen, 0-2 /HPF Final   • Bacteria, UA 06/13/2021 4+* None Seen /HPF Final   • Squamous Epithelial Cells, UA 06/13/2021 None Seen  None Seen, 0-2 /HPF Final   • Hyaline Casts, UA 06/13/2021 None Seen  None Seen /LPF Final   • Methodology 06/13/2021 Manual Light Microscopy   Final   • Urine Culture 06/13/2021 >100,000 CFU/mL Escherichia coli*  Preliminary   • Lactate 06/13/2021 1.8  0.5 - 2.0 mmol/L Final   • COVID19 06/13/2021 Not Detected  Not Detected - Ref. Range Final   • Hemoglobin A1C 06/14/2021 8.70* 4.80 - 5.60 % Final   • Glucose 06/14/2021 246* 65 - 99 mg/dL Final   • BUN 06/14/2021 13  6 - 20 mg/dL Final   • Creatinine 06/14/2021 1.02  0.76 - 1.27 mg/dL Final   • Sodium 06/14/2021 132* 136 - 145 mmol/L Final   • Potassium 06/14/2021 4.4  3.5 - 5.2 mmol/L Final    Specimen hemolyzed.  Results may be affected.   • Chloride 06/14/2021 98  98 - 107 mmol/L Final   • CO2 06/14/2021 22.5  22.0 - 29.0 mmol/L Final   • Calcium 06/14/2021 9.0  8.6 - 10.5 mg/dL Final   • eGFR Non  Amer 06/14/2021 77  >60 mL/min/1.73 Final   • BUN/Creatinine Ratio 06/14/2021 12.7  7.0 - 25.0 Final   • Anion Gap 06/14/2021 11.5  5.0 - 15.0 mmol/L Final   • WBC 06/14/2021 9.87  3.40 - 10.80 10*3/mm3 Final   • RBC 06/14/2021 4.51  4.14 - 5.80 10*6/mm3 Final   • Hemoglobin 06/14/2021 15.2  13.0 - 17.7 g/dL Final   • Hematocrit 06/14/2021 43.1  37.5 - 51.0 % Final   • MCV 06/14/2021 95.6  79.0 - 97.0 fL Final   • MCH 06/14/2021 33.7* 26.6 - 33.0 pg Final   • MCHC 06/14/2021 35.3  31.5 - 35.7 g/dL Final   • RDW 06/14/2021 11.6* 12.3 - 15.4 % Final   • RDW-SD 06/14/2021 40.6  37.0 - 54.0 fl Final   • MPV 06/14/2021 9.8  6.0 - 12.0 fL Final   • Platelets 06/14/2021 257  140 - 450 10*3/mm3 Final   • Glucose 06/14/2021 257* 70 - 130 mg/dL Final   • Glucose 06/14/2021 268* 70 - 130 mg/dL Final   • Sed Rate 06/14/2021 14  0 - 20 mm/hr Final   • C-Reactive Protein 06/14/2021 1.69* 0.00 - 0.50 mg/dL Final   • TSH 06/14/2021 1.430  0.270  - 4.200 uIU/mL Final   • Total Protein 06/14/2021 7.9  6.0 - 8.5 g/dL Final   • Albumin 06/14/2021 4.10  3.50 - 5.20 g/dL Final   • ALT (SGPT) 06/14/2021 56* 1 - 41 U/L Final   • AST (SGOT) 06/14/2021 35  1 - 40 U/L Final    Specimen hemolyzed.  Results may be affected.   • Alkaline Phosphatase 06/14/2021 63  39 - 117 U/L Final   • Total Bilirubin 06/14/2021 1.2  0.0 - 1.2 mg/dL Final   • Bilirubin, Direct 06/14/2021 0.2  0.0 - 0.3 mg/dL Final    Specimen hemolyzed. Results may be affected.   • Bilirubin, Indirect 06/14/2021 1.0  mg/dL Final   • Glucose 06/14/2021 251* 70 - 130 mg/dL Final       No results found.      Assessment:   UTI, suspect prostatitis, would treat with 14 d total of abx quinolone or bactrim, pending cx sensitivity results. CT reviewed, no obstructive uropathy.       Plan:   Abx per medical team, no gu surgical intervention. Pt to fu w me in 3-4 weeks will check UA and progress. Call if questions.       Francisco Wynn MD  6/14/2021

## 2021-06-14 NOTE — PLAN OF CARE
Goal Outcome Evaluation:              Outcome Summary: Pt admitted through ED with hematuria and buring with urination. Pt aox4, up ad [place] and on room air. Blood cultures pending. CT of abdomen/pelvis planned for today. IV rocephine q24. Pt had a temp of 101.4, tylenol given x1, last temp 97.7. All other VSS will continue to monitor.
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
## 2021-06-14 NOTE — PLAN OF CARE
Goal Outcome Evaluation:  Plan of Care Reviewed With: patient, spouse        Progress: improving  Outcome Summary: pt admits to not having any hematuria this shift, minimal burning at end of stream. urology planning for antibiotics and to follow up outpatient. plan for MRI in am, pt must be NPO for at least 6hr, educated. VSS will continue to monitor

## 2021-06-15 ENCOUNTER — APPOINTMENT (OUTPATIENT)
Dept: MRI IMAGING | Facility: HOSPITAL | Age: 51
End: 2021-06-15

## 2021-06-15 PROBLEM — E53.8 VITAMIN B12 DEFICIENCY: Status: ACTIVE | Noted: 2021-06-15

## 2021-06-15 PROBLEM — E78.1 HIGH TRIGLYCERIDES: Status: ACTIVE | Noted: 2021-06-15

## 2021-06-15 PROBLEM — N41.0 ACUTE BACTERIAL PROSTATITIS: Status: ACTIVE | Noted: 2021-06-15

## 2021-06-15 LAB
ALBUMIN SERPL-MCNC: 3.9 G/DL (ref 3.5–5.2)
ALBUMIN/GLOB SERPL: 1.2 G/DL
ALP SERPL-CCNC: 54 U/L (ref 39–117)
ALT SERPL W P-5'-P-CCNC: 49 U/L (ref 1–41)
ANION GAP SERPL CALCULATED.3IONS-SCNC: 10.6 MMOL/L (ref 5–15)
AST SERPL-CCNC: 32 U/L (ref 1–40)
BACTERIA SPEC AEROBE CULT: ABNORMAL
BILIRUB SERPL-MCNC: 1.3 MG/DL (ref 0–1.2)
BUN SERPL-MCNC: 12 MG/DL (ref 6–20)
BUN/CREAT SERPL: 11.8 (ref 7–25)
CALCIUM SPEC-SCNC: 8.9 MG/DL (ref 8.6–10.5)
CHLORIDE SERPL-SCNC: 100 MMOL/L (ref 98–107)
CHOLEST SERPL-MCNC: 128 MG/DL (ref 0–200)
CO2 SERPL-SCNC: 26.4 MMOL/L (ref 22–29)
CREAT SERPL-MCNC: 1.02 MG/DL (ref 0.76–1.27)
DEPRECATED RDW RBC AUTO: 44.2 FL (ref 37–54)
ERYTHROCYTE [DISTWIDTH] IN BLOOD BY AUTOMATED COUNT: 11.9 % (ref 12.3–15.4)
FOLATE SERPL-MCNC: 5.71 NG/ML (ref 4.78–24.2)
GFR SERPL CREATININE-BSD FRML MDRD: 77 ML/MIN/1.73
GLOBULIN UR ELPH-MCNC: 3.3 GM/DL
GLUCOSE BLDC GLUCOMTR-MCNC: 157 MG/DL (ref 70–130)
GLUCOSE BLDC GLUCOMTR-MCNC: 161 MG/DL (ref 70–130)
GLUCOSE BLDC GLUCOMTR-MCNC: 243 MG/DL (ref 70–130)
GLUCOSE BLDC GLUCOMTR-MCNC: 253 MG/DL (ref 70–130)
GLUCOSE SERPL-MCNC: 239 MG/DL (ref 65–99)
HCT VFR BLD AUTO: 46.9 % (ref 37.5–51)
HDLC SERPL-MCNC: 20 MG/DL (ref 40–60)
HGB BLD-MCNC: 16 G/DL (ref 13–17.7)
LDLC SERPL CALC-MCNC: 75 MG/DL (ref 0–100)
LDLC/HDLC SERPL: 3.45 {RATIO}
MCH RBC QN AUTO: 34 PG (ref 26.6–33)
MCHC RBC AUTO-ENTMCNC: 34.1 G/DL (ref 31.5–35.7)
MCV RBC AUTO: 99.6 FL (ref 79–97)
PLATELET # BLD AUTO: 258 10*3/MM3 (ref 140–450)
PMV BLD AUTO: 9.7 FL (ref 6–12)
POTASSIUM SERPL-SCNC: 4.5 MMOL/L (ref 3.5–5.2)
PROT SERPL-MCNC: 7.2 G/DL (ref 6–8.5)
RBC # BLD AUTO: 4.71 10*6/MM3 (ref 4.14–5.8)
SODIUM SERPL-SCNC: 137 MMOL/L (ref 136–145)
TRIGL SERPL-MCNC: 195 MG/DL (ref 0–150)
VIT B12 BLD-MCNC: 395 PG/ML (ref 211–946)
VLDLC SERPL-MCNC: 33 MG/DL (ref 5–40)
WBC # BLD AUTO: 8.56 10*3/MM3 (ref 3.4–10.8)

## 2021-06-15 PROCEDURE — 0 GADOBENATE DIMEGLUMINE 529 MG/ML SOLUTION: Performed by: HOSPITALIST

## 2021-06-15 PROCEDURE — 82746 ASSAY OF FOLIC ACID SERUM: CPT | Performed by: HOSPITALIST

## 2021-06-15 PROCEDURE — G0378 HOSPITAL OBSERVATION PER HR: HCPCS

## 2021-06-15 PROCEDURE — 82607 VITAMIN B-12: CPT | Performed by: HOSPITALIST

## 2021-06-15 PROCEDURE — 82962 GLUCOSE BLOOD TEST: CPT

## 2021-06-15 PROCEDURE — 80053 COMPREHEN METABOLIC PANEL: CPT | Performed by: NURSE PRACTITIONER

## 2021-06-15 PROCEDURE — 74183 MRI ABD W/O CNTR FLWD CNTR: CPT

## 2021-06-15 PROCEDURE — 96361 HYDRATE IV INFUSION ADD-ON: CPT

## 2021-06-15 PROCEDURE — 80061 LIPID PANEL: CPT | Performed by: NURSE PRACTITIONER

## 2021-06-15 PROCEDURE — 85027 COMPLETE CBC AUTOMATED: CPT | Performed by: NURSE PRACTITIONER

## 2021-06-15 PROCEDURE — A9577 INJ MULTIHANCE: HCPCS | Performed by: HOSPITALIST

## 2021-06-15 RX ORDER — CHOLECALCIFEROL (VITAMIN D3) 125 MCG
1000 CAPSULE ORAL DAILY
Status: DISCONTINUED | OUTPATIENT
Start: 2021-06-15 | End: 2021-06-16 | Stop reason: HOSPADM

## 2021-06-15 RX ORDER — SULFAMETHOXAZOLE AND TRIMETHOPRIM 800; 160 MG/1; MG/1
1 TABLET ORAL EVERY 12 HOURS SCHEDULED
Status: DISCONTINUED | OUTPATIENT
Start: 2021-06-15 | End: 2021-06-16 | Stop reason: HOSPADM

## 2021-06-15 RX ADMIN — SULFAMETHOXAZOLE AND TRIMETHOPRIM 160 MG: 800; 160 TABLET ORAL at 21:42

## 2021-06-15 RX ADMIN — GADOBENATE DIMEGLUMINE 20 ML: 529 INJECTION, SOLUTION INTRAVENOUS at 20:10

## 2021-06-15 RX ADMIN — SODIUM CHLORIDE 75 ML/HR: 9 INJECTION, SOLUTION INTRAVENOUS at 03:27

## 2021-06-15 RX ADMIN — ACETAMINOPHEN 650 MG: 325 TABLET, FILM COATED ORAL at 23:05

## 2021-06-15 RX ADMIN — ACETAMINOPHEN 650 MG: 325 TABLET, FILM COATED ORAL at 14:46

## 2021-06-15 RX ADMIN — ACETAMINOPHEN 650 MG: 325 TABLET, FILM COATED ORAL at 10:29

## 2021-06-15 RX ADMIN — Medication 1000 MCG: at 21:46

## 2021-06-15 NOTE — CASE MANAGEMENT/SOCIAL WORK
Discharge Planning Assessment  [facility]     Patient Name: Orville Carrillo  MRN: [id number]  Today's Date: 6/15/2021    Admit Date: 6/13/2021    Discharge Needs Assessment     Row Name 06/15/21 1400       Living Environment    Lives With  spouse    Name(s) of Who Lives With Patient  Ryanne Carrillo/spouse [phone]    Current Living Arrangements  home/apartment/condo    Primary Care Provided by  self    Family Caregiver if Needed  spouse    Family Caregiver Names  Ryanne Carrillo/spouse [phone]    Quality of Family Relationships  helpful;involved;supportive    Able to Return to Prior Arrangements  yes    Living Arrangement Comments  Lives with spouse in a single level house. One step to enter.       Resource/Environmental Concerns    Resource/Environmental Concerns  none       Transition Planning    Patient/Family Anticipates Transition to  home with family    Patient/Family Anticipated Services at Transition  none    Transportation Anticipated  family or friend will provide       Discharge Needs Assessment    Readmission Within the Last 30 Days  no previous admission in last 30 days    Equipment Currently Used at Home  glucometer    Concerns to be Addressed  no discharge needs identified;denies needs/concerns at this time    Anticipated Changes Related to Illness  none    Equipment Needed After Discharge  none    Provided Post Acute Provider List?  N/A    N/A Provider List Comment  No skilled needs noted.    Patient's Choice of Community Agency(s)  Denies using HH/SNF in the past.    Discharge Coordination/Progress  Spoke with patient and spouse at bedside regarding dc plans/needs. Confirmed information on facesheet.        Discharge Plan     Row Name 06/15/21 1431       Plan    Plan  Plans dc home with assist of spouse.    Patient/Family in Agreement with Plan  yes    Plan Comments  Spoke with patient and spouse at bedside regarding dc plans/needs. Plans dc home with assist of spouse. No needs identified  at this time. Family to transport per private auto. Continue to follow.....[facility]        Continued Care and Services - Admitted Since 6/13/2021    Coordination has not been started for this encounter.         Demographic Summary     Row Name 06/15/21 1353       General Information    Admission Type  observation    Referral Source  admission list    Reason for Consult  discharge planning        Functional Status     Row Name 06/15/21 1351       Functional Status    Usual Activity Tolerance  good    Current Activity Tolerance  good       Functional Status, IADL    Medications  independent    Meal Preparation  independent    Housekeeping  independent    Laundry  independent    Shopping  independent        Psychosocial    No documentation.       Abuse/Neglect    No documentation.       Legal    No documentation.       Substance Abuse    No documentation.       Patient Forms    No documentation.           Patricia Gomez RN
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
## 2021-06-15 NOTE — PLAN OF CARE
Problem: Adult Inpatient Plan of Care  Goal: Plan of Care Review  Outcome: Ongoing, Progressing  Flowsheets (Taken 6/15/2021 0059)  Plan of Care Reviewed With: patient  Outcome Summary: VITALS AS DOCUMENTED. PRN TYLENOL EFFECTIVE AT HS FOR C/O LOWER BACK PAIN. PATIENT ABLE TO VAERBALIZE UNDERSANDING OF NPO STATUS AFTER MN FOR MRI IN AM. FAN PROVIDED FOR COMFORT. MELATONIN APPEARS EFFECTIVE TO ASSIST WITH SLEEP. NO S/SX OF DISTRESS. RESTING QUIETLY. MONITORING.  Goal: Patient-Specific Goal (Individualized)  Outcome: Ongoing, Progressing  Goal: Absence of Hospital-Acquired Illness or Injury  Outcome: Ongoing, Progressing  Intervention: Identify and Manage Fall Risk  Description: Perform standard risk assessment with a validated tool or comprehensive approach appropriate to the patient on admission; reassess fall risk frequently, with change in status or transfer to another level of care.  Communicate fall injury risk to interprofessional healthcare team.  Determine need for increased observation, equipment and environmental modification, such as low bed and signage, as well as supportive, nonskid footwear.  Adjust safety measures to individual developmental age, stage and identified risk factors.  Reinforce the importance of safety and physical activity with patient and family.  Perform regular intentional rounding to assess need for position change, pain assessment, personal needs, including assistance with toileting.  Recent Flowsheet Documentation  Taken 6/15/2021 0000 by Karen Vasquez, RN  Safety Promotion/Fall Prevention:   assistive device/personal items within reach   clutter free environment maintained   fall prevention program maintained   nonskid shoes/slippers when out of bed   room organization consistent   safety round/check completed  Taken 6/14/2021 2220 by Karen Vasquez, RN  Safety Promotion/Fall Prevention:   assistive device/personal items within reach   clutter free environment maintained   fall  prevention program maintained   nonskid shoes/slippers when out of bed   room organization consistent   safety round/check completed  Taken 6/14/2021 2114 by Karen Vasquez RN  Safety Promotion/Fall Prevention:   assistive device/personal items within reach   clutter free environment maintained   fall prevention program maintained   nonskid shoes/slippers when out of bed   room organization consistent   safety round/check completed   lighting adjusted  Taken 6/14/2021 1955 by Karen Vasquez RN  Safety Promotion/Fall Prevention:   clutter free environment maintained   assistive device/personal items within reach   fall prevention program maintained   nonskid shoes/slippers when out of bed   room organization consistent   safety round/check completed  Intervention: Prevent Skin Injury  Description: Assess skin risk on admission and at regular intervals throughout hospital stay.  Keep all areas of skin (especially folds) clean and dry.  Maintain adequate skin hydration.  Relieve and redistribute pressure and protect bony prominences; implement measures based on patient-specific risk factors.  Match turning and repositioning schedule to clinical condition.  Encourage weight shift frequently; assist with reposition if unable to complete independently.  Float heels off bed. Avoid pressure on the Achilles tendon.  Keep skin free from extended contact with medical devices.  Use aids (e.g., slide boards, mechanical lift) during transfer.  Recent Flowsheet Documentation  Taken 6/15/2021 0000 by Karen Vasquez RN  Body Position:   position changed independently   tilted, right   neutral body alignment   neutral head position  Taken 6/14/2021 2220 by Karen Vasquez RN  Body Position:   position changed independently   supine   neutral body alignment   neutral head position  Taken 6/14/2021 2114 by Karen Vasquez RN  Body Position:   position changed independently   neutral body alignment   neutral head position    supine  Skin Protection: adhesive use limited  Taken 6/14/2021 1955 by Karen Vasquez RN  Body Position:   position changed independently   tilted, right   neutral body alignment   neutral head position  Intervention: Prevent and Manage VTE (venous thromboembolism) Risk  Description: Assess for VTE risk.  Encourage/assist with early ambulation.  Initiate and maintain compression or other therapy, as indicated based on identified risk in accordance with organizational protocol/provider order.  Encourage both active and passive leg exercises while in bed, if unable to ambulate.  Recent Flowsheet Documentation  Taken 6/14/2021 2114 by Karen Vasquez RN  VTE Prevention/Management:   bilateral   dorsiflexion/plantar flexion performed  Intervention: Prevent Infection  Description: Maintain skin and mucous membrane integrity; promote hand, oral and pulmonary hygiene.  Optimize fluid balance, nutrition, sleep and glycemic control to maximize infection resistance.  Identify potential sources of infection early to prevent or mitigate progression of infection (e.g., wound, lines, devices).  Evaluate ongoing need for invasive devices; remove promptly when no longer indicated.  Recent Flowsheet Documentation  Taken 6/15/2021 0000 by Karen Vasquez RN  Infection Prevention:   equipment surfaces disinfected   hand hygiene promoted   personal protective equipment utilized   rest/sleep promoted   single patient room provided   visitors restricted/screened  Taken 6/14/2021 2220 by Karen Vasquez RN  Infection Prevention:   equipment surfaces disinfected   hand hygiene promoted   personal protective equipment utilized   rest/sleep promoted   single patient room provided   visitors restricted/screened  Taken 6/14/2021 1955 by Karen Vasquez RN  Infection Prevention:   equipment surfaces disinfected   hand hygiene promoted   personal protective equipment utilized   rest/sleep promoted   single patient room provided   visitors  restricted/screened  Goal: Optimal Comfort and Wellbeing  Outcome: Ongoing, Progressing  Intervention: Provide Person-Centered Care  Description: Use a family-focused approach to care.  Develop trust and rapport by proactively providing information, encouraging questions, addressing concerns and offering reassurance.  Acknowledge emotional response to hospitalization.  Recognize and utilize personal coping strategies.  Honor spiritual and cultural preferences.  Recent Flowsheet Documentation  Taken 6/15/2021 0000 by Karen Vasquez RN  Trust Relationship/Rapport:   care explained   emotional support provided   choices provided   empathic listening provided   questions answered   questions encouraged   thoughts/feelings acknowledged   reassurance provided  Taken 6/14/2021 2114 by Karen Vasquez, RN  Trust Relationship/Rapport:   care explained   choices provided   emotional support provided   empathic listening provided   questions encouraged   questions answered   reassurance provided   thoughts/feelings acknowledged  Goal: Readiness for Transition of Care  Outcome: Ongoing, Progressing     Problem: Pain Acute  Goal: Optimal Pain Control  Outcome: Ongoing, Progressing  Intervention: Prevent or Manage Pain  Description: Evaluate pain level, effect of treatment and patient response at regular intervals.  Minimize pain stimuli; coordinate care and adjust environment (e.g., light, noise, unnecessary movement); promote sleep/rest.  Match pharmacologic analgesia to severity and type of pain mechanism; consider multimodal approach (e.g., nonopioid, opioid, adjuvant).  Provide medication at regular intervals; titrate to patient response; premedicate for painful procedures.  Manage breakthrough pain with additional doses; consider rotation or switching medication.  Monitor for signs of substance tolerance (increased dose to reach desired effect, decreased effect with same dose).  Manage medication-induced effects, such as  constipation, nausea, urinary retention, somnolence and dizziness.  Consider nonpharmacologic pain interventions to improve function (e.g., massage, transcutaneous electrical nerve stimulation, vibration, heat or cold application, immobilization, hydrotherapy).  Consider addition of complementary or alternative therapy, such as acupuncture, hypnosis or therapeutic touch.  Recent Flowsheet Documentation  Taken 6/14/2021 2114 by Karen Vasquez, RN  Sensory Stimulation Regulation:   care clustered   lighting decreased   auditory stimulation minimized   quiet environment promoted   television on  Intervention: Optimize Psychosocial Wellbeing  Description: Facilitate patient’s self-control over pain by providing pain information and allowing choices in treatment.  Consider and address emotional response to pain.  Explore and promote use of coping strategies; address barriers to successful coping.  Evaluate and assist with psychosocial, cultural and spiritual factors impacting pain.  Modify pain perception by using cognitive-behavioral techniques such as distraction, guided imagery, meditation, relaxation or music.  Recent Flowsheet Documentation  Taken 6/15/2021 0000 by Karen Vasquez, RN  Supportive Measures:   active listening utilized   decision-making supported   positive reinforcement provided  Diversional Activities: television  Taken 6/14/2021 2114 by Karen Vasquez, RN  Supportive Measures:   active listening utilized   decision-making supported   positive reinforcement provided  Diversional Activities:   television   smartphone  Spiritual Activities Assistance: affirmation provided     Problem: Fall Injury Risk  Goal: Absence of Fall and Fall-Related Injury  Outcome: Ongoing, Progressing  Intervention: Identify and Manage Contributors to Fall Injury Risk  Description: Reassess fall risk frequently and with change in status or transfer to another level of care.  Communicate fall injury risk to all healthcare team  members (e.g., rounds, change of shift/provider, patient transport).  Anticipate needs; perform regular intentional rounding to assess need for position change, pain assessment, personal needs (e.g., toileting) and placement of necessary items.  Provide reorientation, appropriate sensory stimulation and routines with changes in mental status to decrease risk of fall.  Promote use of personal vision and auditory aids (e.g., glasses, hearing aids).  Assess assistance level required for safe and effective care; provide support as needed (e.g., toileting, bathing, mobilization).  Define behavior and activity limits to patient and family.  If fall occurs, assess for and treat injury; determine cause; revise fall injury prevention plan.  Regularly review medication contribution to fall risk; adjust medication administration times to minimize risk of falling.  Consider risk related to polypharmacy and age.  Balance adequate pain management with potential for oversedation.  Recent Flowsheet Documentation  Taken 6/15/2021 0000 by Karen Vasquez, RN  Medication Review/Management: medications reviewed  Taken 6/14/2021 2220 by Karen Vasquez, RN  Medication Review/Management: medications reviewed  Taken 6/14/2021 2114 by Karen Vasquez, RN  Medication Review/Management: medications reviewed  Taken 6/14/2021 1955 by Karen Vasquez, RN  Medication Review/Management: medications reviewed  Intervention: Promote Injury-Free Environment  Description: Provide a safe, barrier-free environment that encourages independent activity.  Keep care area uncluttered and well-lighted.  Determine need for increased observation or auditory alerts (e.g., bed or chair alarm).  Assess equipment and environmental modification needs (e.g., low bed, signage, nonskid footwear, grab bars).  Avoid use of restraints.  Recent Flowsheet Documentation  Taken 6/15/2021 0000 by Karen Vasquez, RN  Safety Promotion/Fall Prevention:   assistive device/personal  items within reach   clutter free environment maintained   fall prevention program maintained   nonskid shoes/slippers when out of bed   room organization consistent   safety round/check completed  Taken 6/14/2021 2220 by Karen Vasquez, RN  Safety Promotion/Fall Prevention:   assistive device/personal items within reach   clutter free environment maintained   fall prevention program maintained   nonskid shoes/slippers when out of bed   room organization consistent   safety round/check completed  Taken 6/14/2021 2114 by Karen Vasquez, RN  Safety Promotion/Fall Prevention:   assistive device/personal items within reach   clutter free environment maintained   fall prevention program maintained   nonskid shoes/slippers when out of bed   room organization consistent   safety round/check completed   lighting adjusted  Taken 6/14/2021 1955 by Karen Vasquez, RN  Safety Promotion/Fall Prevention:   clutter free environment maintained   assistive device/personal items within reach   fall prevention program maintained   nonskid shoes/slippers when out of bed   room organization consistent   safety round/check completed     Problem: Diabetes Comorbidity  Goal: Blood Glucose Level Within Desired Range  Outcome: Ongoing, Progressing  Intervention: Maintain Glycemic Control  Description: Establish target blood glucose levels based on patient-specific factors such as age, developmental stage and illness severity.  Document blood glucose levels and monitor trend; advocate for treatment if not within desired range.  Provide pharmacologic therapy to maintain glycemic control.  Advocate for correctional doses if blood glucose level is above targeted blood glucose level; match insulin dose to carbohydrate intake to avoid elevated postprandial blood glucose level.  Establish and follow a plan to identify and treat hypoglycemia.  Avoid hypoglycemic episodes by adjusting insulin dose to change in condition (e.g., illness severity,  decreased oral intake, missed or refused meals/snacks or medication change, such as steroid taper).  Identify potential cause in event of a decreased blood glucose level.  Recent Flowsheet Documentation  Taken 6/14/2021 2114 by Karen Vasquez RN  Glycemic Management: blood glucose monitoring     Problem: Skin Injury Risk Increased  Goal: Skin Health and Integrity  Outcome: Ongoing, Progressing  Intervention: Optimize Skin Protection  Description: Reassess skin injury risk and inspect skin frequently (e.g., scheduled interval, with turning, with change in condition) to provide optimal prevention.  Maintain adequate tissue perfusion (e.g., encourage fluid balance, avoid crossing legs, constrictive clothing or devices) to promote tissue oxygenation.  Maintain head of bed at lowest degree of elevation tolerated, considering medical condition and other restrictions. Limit amount of time head of bed is elevated greater than 30 degrees to prevent friction/shear injury.  Avoid positioning onto an area that remains reddened.  Minimize incontinence and moisture (e.g., toileting schedule; moisture-wicking pad, diaper or incontinence collection device, skin moisture barrier).  Cleanse skin promptly and gently when soiled utilizing a pH-balanced cleanser.  Relieve and redistribute pressure (e.g., schedule position changes; utilize higher specification foam mattress, chair cushion, constant low-pressure or alternating-pressure support surface; medical device repositioning; protective dressing applicatio  Support increased progressive functional activity (e.g., therapeutic exercise) to decrease risk associated with immobility. Balance rest with activity.  Recent Flowsheet Documentation  Taken 6/15/2021 0000 by Karen Vasquez, RN  Head of Bed (HOB): HOB at 20-30 degrees  Taken 6/14/2021 2220 by Karen Vasquez RN  Head of Bed (HOB): HOB at 15 degrees  Taken 6/14/2021 2114 by Karen Vasquez RN  Head of Bed (HOB): HOB at 20-30  degrees  Skin Protection: adhesive use limited  Taken 6/14/2021 1955 by Karen Vasquez, RN  Head of Bed (HOB): HOB at 20 degrees   Goal Outcome Evaluation:  Plan of Care Reviewed With: patient           Outcome Summary: VITALS AS DOCUMENTED. PRN TYLENOL EFFECTIVE AT HS FOR C/O LOWER BACK PAIN. PATIENT ABLE TO VAERBALIZE UNDERSANDING OF NPO STATUS AFTER MN FOR MRI IN AM. FAN PROVIDED FOR COMFORT. MELATONIN APPEARS EFFECTIVE TO ASSIST WITH SLEEP. NO S/SX OF DISTRESS. RESTING QUIETLY. MONITORING.

## 2021-06-15 NOTE — PLAN OF CARE
Goal Outcome Evaluation:      Patient NPO waiting for MRI of abdomen, diabetes education consult, IV fluids D/C, antibiotics switched to oral

## 2021-06-16 VITALS
BODY MASS INDEX: 31.86 KG/M2 | DIASTOLIC BLOOD PRESSURE: 87 MMHG | TEMPERATURE: 97.4 F | OXYGEN SATURATION: 98 % | HEIGHT: 71 IN | SYSTOLIC BLOOD PRESSURE: 135 MMHG | HEART RATE: 78 BPM | WEIGHT: 227.6 LBS | RESPIRATION RATE: 18 BRPM

## 2021-06-16 LAB
ALBUMIN SERPL-MCNC: 3.7 G/DL (ref 3.5–5.2)
ALBUMIN/GLOB SERPL: 1.1 G/DL
ALP SERPL-CCNC: 50 U/L (ref 39–117)
ALT SERPL W P-5'-P-CCNC: 45 U/L (ref 1–41)
ANION GAP SERPL CALCULATED.3IONS-SCNC: 11.8 MMOL/L (ref 5–15)
AST SERPL-CCNC: 35 U/L (ref 1–40)
BILIRUB SERPL-MCNC: 1.2 MG/DL (ref 0–1.2)
BUN SERPL-MCNC: 11 MG/DL (ref 6–20)
BUN/CREAT SERPL: 11.3 (ref 7–25)
CALCIUM SPEC-SCNC: 9 MG/DL (ref 8.6–10.5)
CHLORIDE SERPL-SCNC: 100 MMOL/L (ref 98–107)
CO2 SERPL-SCNC: 23.2 MMOL/L (ref 22–29)
CREAT SERPL-MCNC: 0.97 MG/DL (ref 0.76–1.27)
DEPRECATED RDW RBC AUTO: 41.6 FL (ref 37–54)
ERYTHROCYTE [DISTWIDTH] IN BLOOD BY AUTOMATED COUNT: 11.8 % (ref 12.3–15.4)
GFR SERPL CREATININE-BSD FRML MDRD: 82 ML/MIN/1.73
GLOBULIN UR ELPH-MCNC: 3.3 GM/DL
GLUCOSE BLDC GLUCOMTR-MCNC: 220 MG/DL (ref 70–130)
GLUCOSE BLDC GLUCOMTR-MCNC: 266 MG/DL (ref 70–130)
GLUCOSE SERPL-MCNC: 204 MG/DL (ref 65–99)
HCT VFR BLD AUTO: 46.3 % (ref 37.5–51)
HGB BLD-MCNC: 16 G/DL (ref 13–17.7)
MAGNESIUM SERPL-MCNC: 2 MG/DL (ref 1.6–2.6)
MCH RBC QN AUTO: 33.2 PG (ref 26.6–33)
MCHC RBC AUTO-ENTMCNC: 34.6 G/DL (ref 31.5–35.7)
MCV RBC AUTO: 96.1 FL (ref 79–97)
PLATELET # BLD AUTO: 264 10*3/MM3 (ref 140–450)
PMV BLD AUTO: 9.3 FL (ref 6–12)
POTASSIUM SERPL-SCNC: 4.2 MMOL/L (ref 3.5–5.2)
PROT SERPL-MCNC: 7 G/DL (ref 6–8.5)
RBC # BLD AUTO: 4.82 10*6/MM3 (ref 4.14–5.8)
SODIUM SERPL-SCNC: 135 MMOL/L (ref 136–145)
WBC # BLD AUTO: 8.28 10*3/MM3 (ref 3.4–10.8)

## 2021-06-16 PROCEDURE — G0378 HOSPITAL OBSERVATION PER HR: HCPCS

## 2021-06-16 PROCEDURE — 80053 COMPREHEN METABOLIC PANEL: CPT | Performed by: NURSE PRACTITIONER

## 2021-06-16 PROCEDURE — 85027 COMPLETE CBC AUTOMATED: CPT | Performed by: NURSE PRACTITIONER

## 2021-06-16 PROCEDURE — 83735 ASSAY OF MAGNESIUM: CPT | Performed by: NURSE PRACTITIONER

## 2021-06-16 PROCEDURE — 63710000001 INSULIN LISPRO (HUMAN) PER 5 UNITS: Performed by: INTERNAL MEDICINE

## 2021-06-16 PROCEDURE — 82962 GLUCOSE BLOOD TEST: CPT

## 2021-06-16 RX ORDER — SULFAMETHOXAZOLE AND TRIMETHOPRIM 800; 160 MG/1; MG/1
1 TABLET ORAL EVERY 12 HOURS SCHEDULED
Qty: 22 TABLET | Refills: 0 | Status: SHIPPED | OUTPATIENT
Start: 2021-06-16 | End: 2021-06-27

## 2021-06-16 RX ORDER — ACETAMINOPHEN 500 MG
1000 TABLET ORAL EVERY 8 HOURS PRN
Status: DISCONTINUED | OUTPATIENT
Start: 2021-06-16 | End: 2021-06-16 | Stop reason: HOSPADM

## 2021-06-16 RX ADMIN — LISINOPRIL 20 MG: 20 TABLET ORAL at 08:37

## 2021-06-16 RX ADMIN — ACETAMINOPHEN 1000 MG: 500 TABLET, FILM COATED ORAL at 09:31

## 2021-06-16 RX ADMIN — INSULIN LISPRO 4 UNITS: 100 INJECTION, SOLUTION INTRAVENOUS; SUBCUTANEOUS at 08:37

## 2021-06-16 RX ADMIN — SULFAMETHOXAZOLE AND TRIMETHOPRIM 160 MG: 800; 160 TABLET ORAL at 08:37

## 2021-06-16 RX ADMIN — Medication 1000 MCG: at 08:37

## 2021-06-16 RX ADMIN — INSULIN LISPRO 6 UNITS: 100 INJECTION, SOLUTION INTRAVENOUS; SUBCUTANEOUS at 12:27

## 2021-06-16 NOTE — CASE MANAGEMENT/SOCIAL WORK
Case Management Discharge Note      Final Note: Home--no needs    Provided Post Acute Provider List?: N/A  N/A Provider List Comment: No skilled needs noted.    Selected Continued Care - Admitted Since 6/13/2021     Destination    No services have been selected for the patient.              Durable Medical Equipment    No services have been selected for the patient.              Dialysis/Infusion    No services have been selected for the patient.              Home Medical Care    No services have been selected for the patient.              Therapy    No services have been selected for the patient.              Community Resources    No services have been selected for the patient.              Community & DME    No services have been selected for the patient.                       Final Discharge Disposition Code: 01 - home or self-care

## 2021-06-16 NOTE — CONSULTS
"Diabetes Education  Assessment/Teaching    Patient Name:  Orville Carrillo  YOB: 1970  MRN: [id number]  Admit Date:  6/13/2021      Assessment Date:  6/16/2021    Most Recent Value   General Information    Referral From:  A1c, Database [A1C 8.7%]   Height  180.3 cm (71\")   Height Method  Stated   Weight  103 kg (227 lb 9.6 oz)   Weight Method  Standing scale   Pregnancy Assessment   Diabetes History   What type of diabetes do you have?  Type 2   Length of Diabetes Diagnosis  10 + years   Current DM knowledge  good   Have you had diabetes education/teaching in the past?  yes   Do you test your blood sugar at home?  yes   Frequency of checks  couple times week   Have you had low blood sugar? (<70mg/dl)  no   Have you had high blood sugar? (>140mg/dl)  yes   How often do you have high blood sugar?  frequently   How would you rate your diabetes control?  good   How do you feel about having diabetes?  tired   Have You Felt Down, Depressed or Hopeless?  no   Have You Felt Little Interest or Pleasure in Doing Things?  no   What makes it difficult for you to take care of your diabetes or yourself?  recent kidney infection   Education Preferences   What areas of diabetes would you like to learn about?  avoiding high blood sugar, testing my blood sugar at home   Nutrition Information   Assessment Topics   Healthy Eating - Assessment  N/A-unable to assess   Being Active - Assessment  N/A- unable to assess   Taking Medication - Assessment  Competent   Problem Solving - Assessment  Needs education   Monitoring - Assessment  Needs education   DM Goals   Taking Medication - Goal  0-7 days from discharge   Monitoring - Goal  0-7 days from discharge            Most Recent Value   DM Education Needs   Meter  Has own   Frequency of Testing  3 times a week   Medication  Oral [glucophage,glipizide and januvia]   Physical Activity  Walking   Physical Activity Frequency  Occasionally   Healthy Coping  Appropriate "   Motivation  Moderate   Teaching Method  Discussion, Explanation, Teach back   Patient Response  Verbalized understanding [wife at visit]            Other Comments:  Discussed with pt and his wife how he manages his diabetes. He statutes that he tests a few times week,he states he takes his meds regularly(on three oral agents) and he states that his A1C in winter was couple points higher than recent one of 8.7%.   We did discuss if pt would be interested in getting a CGM and he said he had thought about it. Information given to try to get the Cgm.         Electronically signed by:  Paige Edwards RN  06/16/21 14:10 EDT

## 2021-06-16 NOTE — DISCHARGE SUMMARY
[facility]    Date of Admission: 6/13/2021  Date of Discharge:  6/16/2021    PCP: Amira Pleitez MD    Discharge Diagnosis:   Active Hospital Problems    Diagnosis  POA   • **Complicated UTI (urinary tract infection) [N39.0]  Yes   • High triglycerides [E78.1]  Yes   • Vitamin B12 deficiency [E53.8]  Yes   • Acute bacterial prostatitis [N41.0]  Yes   • Short-term memory loss [R41.3]  Yes   • Type 2 diabetes mellitus with ophthalmic complication, without long-term current use of insulin (CMS/[facility]) [E11.39]  Yes   • Hypertension [I10]  Yes   • Abnormal finding on imaging of liver [R93.2]  Yes   • Hematuria [R31.9]  Yes   • TBI (traumatic brain injury) (CMS/[facility]) [S06.9X9A]  Unknown   • Alcohol use [Z72.89]  Yes      Resolved Hospital Problems    Diagnosis Date Resolved POA   • Leukocytosis [D72.829] 06/16/2021 Yes   • ROWENA (acute kidney injury) (CMS/[facility]) [N17.9] 06/16/2021 Yes     Procedures Performed  none     Consults     Date and Time Order Name Status Description    6/13/2021  8:28 PM Inpatient Urology Consult Completed     6/13/2021  6:36 PM [facility] (on-call MD unless specified) Details Completed         Hospital Course  Please see history and physical for details. Patient is a 51 y.o. male that initially presented to the hospital with complaints of hematuria. He was found to have an UTI and admitted for further care. The patient was seen in consultation by Urology as CT A/P did show some thickened appearance to the bladder in addition to hepatic steatosis and blaze hepatic adenopathy. His urine grew out pansensitive E.coli and Urology recommended 14 day treatment with Bactrim DS for the treatment of prostatitis. He is to follow up with them in 3 weeks for repeat urine testing.   The patient was found to have some abnormal liver findings on CT as above. Further MRI of abdomen was pursued given the patient's reports of prior lymphoma work-up and recent fatigue. This  confirmed fatty liver disease as well as enlarged lymph nodes that were felt reactive to the liver disease. He will need repeat CT abdomen in 3 months. He did report chronic daily alcohol use and is currently diabetic with poorly controlled DM of 8.7%. His triglycerides were mildly up and a low fat diet with weight loss was suggested. He will need to follow up with his PCP for further monitoring of this and will be referred to a GI doctor for further monitoring of his fatty liver. He was educated on avoiding or cutting back on alcohol to avoid further issues with cirrhosis. He was found to have a vitamin B12 deficiency as well that is likely due to his alcohol use. Vitamin B12 supplementation was added and he will need further follow up with his PCP.   Given his DM2, he was monitored on SSI while admitted. He admitted to dietary indiscretions and the DM educator has been asked to see prior to leaving. He will continue his metformin (recently reduced to 500 mg BID) as well as Januvia and glipizide. We discussed increasing metformin to 850 BID for now to see if he tolerates. He will work on his diet and exercise and follow up with his PCP for further adjustments of his DM2.    On the day of discharge, his hematuria has resolved. He denies any chest pain, dyspnea, cough, fever or chills. He is eager to go home with his wife and will follow up as advised.     *Note of MRI findings on left kidney. Nursing informed Urology who okayed for discharge and will follow up in office.    I discussed the patient's findings and my recommendations with patient, family, nursing staff and Dr. Tellez.    Condition on Discharge: Improved.     Temp:  [97.1 °F (36.2 °C)-97.6 °F (36.4 °C)] 97.4 °F (36.3 °C)  Heart Rate:  [61-78] 78  Resp:  [16-18] 18  BP: (105-154)/(69-93) 135/87  Body mass index is 31.76 kg/m².    Physical Exam  Vitals and nursing note reviewed.   Constitutional:       General: He is not in acute distress.     Appearance:  He is obese. He is not toxic-appearing.   HENT:      Head: Normocephalic and atraumatic.   Cardiovascular:      Rate and Rhythm: Normal rate and regular rhythm.      Pulses: Normal pulses.      Heart sounds: Normal heart sounds.   Pulmonary:      Effort: Pulmonary effort is normal. No respiratory distress.      Breath sounds: Normal breath sounds.   Abdominal:      General: Bowel sounds are normal. There is no distension.      Palpations: Abdomen is soft.      Tenderness: There is no abdominal tenderness.   Musculoskeletal:         General: No swelling. Normal range of motion.   Skin:     General: Skin is warm and dry.      Findings: No bruising.   Neurological:      Mental Status: He is alert and oriented to person, place, and time.      Sensory: No sensory deficit.      Coordination: Coordination normal.   Psychiatric:         Mood and Affect: Mood normal.         Behavior: Behavior normal.          Discharge Medications      New Medications      Instructions Start Date   cyanocobalamin 1000 MCG tablet  Commonly known as: VITAMIN B-12   1,000 mcg, Oral, Daily   Start Date: June 17, 2021     sulfamethoxazole-trimethoprim 800-160 MG per tablet  Commonly known as: BACTRIM DS,SEPTRA DS   160 mg, Oral, Every 12 Hours Scheduled         Changes to Medications      Instructions Start Date   metFORMIN 850 MG tablet  Commonly known as: GLUCOPHAGE  What changed:   · medication strength  · how much to take   850 mg, Oral, 2 Times Daily With Meals         Continue These Medications      Instructions Start Date   glipizide 5 MG tablet  Commonly known as: GLUCOTROL   5 mg, Oral, Daily With Breakfast      lisinopril-hydrochlorothiazide 20-12.5 MG per tablet  Commonly known as: PRINZIDE,ZESTORETIC   1 tablet, Oral, Every Morning      SITagliptin 100 MG tablet  Commonly known as: JANUVIA   100 mg, Oral, Daily With Breakfast            Diet Instructions     Diet: Regular, Consistent Carbohydrate, Specialty Diet; Thin Liquids, No  Restrictions; Low Fat      Discharge Diet:  Regular  Consistent Carbohydrate  Specialty Diet       Fluid Consistency: Thin Liquids, No Restrictions    Specialty Diets: Low Fat         Activity Instructions     Activity as Tolerated           Additional Instructions for the Follow-ups that You Need to Schedule     Discharge Follow-up with PCP   As directed       Currently Documented PCP:    Amira Pleitez MD    PCP Phone Number:    [phone]     Follow Up Details: see in 1 week, needs repeat lipid panel as well as repeat vitamin B12         Discharge Follow-up with Specified Provider: Dr. Wynn; 3 Weeks   As directed      To: Dr. Wynn    Follow Up: 3 Weeks           Follow-up Information     Amira Pleitez MD .    Specialty: Family Medicine  Why: see in 1 week, needs repeat lipid panel as well as repeat vitamin B12  Contact information:  [address] [place] [id number] [phone]             Danish James MD .    Specialty: Gastroenterology  Contact information:  [address] [id number] [phone]                 Test Results Pending at Discharge  Pending Labs     Order Current Status    Blood Culture - Blood, Arm, Left Preliminary result    Blood Culture - Blood, Arm, Left Preliminary result         YOANA Villegas  06/16/21  12:46 EDT    Discharge time spent greater than 30 minutes.

## 2021-06-16 NOTE — NURSING NOTE
Discussed MRI results with Dr. Wynn. No new orders or follow up at this time. The patient is scheduled to follow up with them in 3 weeks as an outpatient.

## 2021-06-16 NOTE — PLAN OF CARE
Goal Outcome Evaluation:           Progress: improving  Outcome Summary: AOx4, Adlib, MRI of ABD completed last night, Pt c/o shoulder pain from MRI & PRN Tylenol was given. VSS. Will continue to monitor.

## 2021-06-18 LAB
BACTERIA SPEC AEROBE CULT: NORMAL
BACTERIA SPEC AEROBE CULT: NORMAL